# Patient Record
Sex: MALE | Race: WHITE | Employment: UNEMPLOYED | URBAN - METROPOLITAN AREA
[De-identification: names, ages, dates, MRNs, and addresses within clinical notes are randomized per-mention and may not be internally consistent; named-entity substitution may affect disease eponyms.]

---

## 2018-06-05 ENCOUNTER — HOSPITAL ENCOUNTER (OUTPATIENT)
Facility: CLINIC | Age: 58
Discharge: HOME | End: 2018-06-05
Attending: INTERNAL MEDICINE | Admitting: INTERNAL MEDICINE
Payer: MEDICAID

## 2018-06-05 VITALS
BODY MASS INDEX: 42.43 KG/M2 | RESPIRATION RATE: 16 BRPM | OXYGEN SATURATION: 96 % | HEIGHT: 66 IN | HEART RATE: 81 BPM | WEIGHT: 264 LBS | DIASTOLIC BLOOD PRESSURE: 74 MMHG | SYSTOLIC BLOOD PRESSURE: 115 MMHG

## 2018-06-05 LAB
COLONOSCOPY: NORMAL
GLUCOSE BLDC GLUCOMTR-MCNC: 208 MG/DL (ref 70–99)

## 2018-06-05 PROCEDURE — 25000128 H RX IP 250 OP 636: Performed by: INTERNAL MEDICINE

## 2018-06-05 PROCEDURE — G0121 COLON CA SCRN NOT HI RSK IND: HCPCS | Performed by: INTERNAL MEDICINE

## 2018-06-05 PROCEDURE — 82962 GLUCOSE BLOOD TEST: CPT

## 2018-06-05 PROCEDURE — G0500 MOD SEDAT ENDO SERVICE >5YRS: HCPCS | Performed by: INTERNAL MEDICINE

## 2018-06-05 PROCEDURE — 45378 DIAGNOSTIC COLONOSCOPY: CPT | Performed by: INTERNAL MEDICINE

## 2018-06-05 RX ORDER — NALOXONE HYDROCHLORIDE 0.4 MG/ML
.1-.4 INJECTION, SOLUTION INTRAMUSCULAR; INTRAVENOUS; SUBCUTANEOUS
Status: DISCONTINUED | OUTPATIENT
Start: 2018-06-05 | End: 2018-06-05 | Stop reason: HOSPADM

## 2018-06-05 RX ORDER — ONDANSETRON 2 MG/ML
4 INJECTION INTRAMUSCULAR; INTRAVENOUS
Status: DISCONTINUED | OUTPATIENT
Start: 2018-06-05 | End: 2018-06-05 | Stop reason: HOSPADM

## 2018-06-05 RX ORDER — LIDOCAINE 40 MG/G
CREAM TOPICAL
Status: DISCONTINUED | OUTPATIENT
Start: 2018-06-05 | End: 2018-06-05 | Stop reason: HOSPADM

## 2018-06-05 RX ORDER — FLUMAZENIL 0.1 MG/ML
0.2 INJECTION, SOLUTION INTRAVENOUS
Status: DISCONTINUED | OUTPATIENT
Start: 2018-06-05 | End: 2018-06-05 | Stop reason: HOSPADM

## 2018-06-05 RX ORDER — ONDANSETRON 4 MG/1
4 TABLET, ORALLY DISINTEGRATING ORAL EVERY 6 HOURS PRN
Status: DISCONTINUED | OUTPATIENT
Start: 2018-06-05 | End: 2018-06-05 | Stop reason: HOSPADM

## 2018-06-05 RX ORDER — FENTANYL CITRATE 50 UG/ML
INJECTION, SOLUTION INTRAMUSCULAR; INTRAVENOUS PRN
Status: DISCONTINUED | OUTPATIENT
Start: 2018-06-05 | End: 2018-06-05 | Stop reason: HOSPADM

## 2018-06-05 RX ORDER — ONDANSETRON 2 MG/ML
4 INJECTION INTRAMUSCULAR; INTRAVENOUS EVERY 6 HOURS PRN
Status: DISCONTINUED | OUTPATIENT
Start: 2018-06-05 | End: 2018-06-05 | Stop reason: HOSPADM

## 2018-06-05 NOTE — H&P
Pre-Endoscopy History and Physical     Jamel Wu MRN# [id number]   YOB: 1960 Age: 58 year old     Date of Procedure: 6/5/2018  Primary care provider: [facility]  Type of Endoscopy: Colonoscopy with possible biopsy, possible polypectomy  Reason for Procedure: screen  Type of Anesthesia Anticipated: Conscious Sedation    HPI:    Jamel is a 58 year old male who will be undergoing the above procedure.      A history and physical has been performed. The patient's medications and allergies have been reviewed. The risks and benefits of the procedure and the sedation options and risks were discussed with the patient.  All questions were answered and informed consent was obtained.      He denies a personal or family history of anesthesia complications or bleeding disorders.     There is no problem list on file for this patient.       No past medical history on file.     No past surgical history on file.    Social History   Substance Use Topics     Smoking status: Not on file     Smokeless tobacco: Not on file     Alcohol use Not on file       No family history on file.    Prior to Admission medications    Not on File       Allergies not on file     REVIEW OF SYSTEMS:   5 point ROS negative except as noted above in HPI, including Gen., Resp., CV, GI &  system review.    PHYSICAL EXAM:   There were no vitals taken for this visit. There is no height or weight on file to calculate BMI.   GENERAL APPEARANCE: alert, and oriented  MENTAL STATUS: alert  AIRWAY EXAM: Mallampatti Class I (visualization of the soft palate, fauces, uvula, anterior and posterior pillars)  RESP: lungs clear to auscultation - no rales, rhonchi or wheezes  CV: regular rates and rhythm  DIAGNOSTICS:    Not indicated    IMPRESSION   ASA Class 2 - Mild systemic disease    PLAN:   Plan for Colonoscopy with possible biopsy, possible polypectomy. We discussed the risks, benefits and alternatives and the  patient wished to proceed.    The above has been forwarded to the consulting provider.      Signed Electronically by: Ceasar Roberson  June 5, 2018

## 2018-06-05 NOTE — IP AVS SNAPSHOT
[facility]    [address]    [facility] [address]    Phone:  [phone]    Fax:  [phone]                                       After Visit Summary   6/5/2018    Jamel Wu    MRN: [id number]           After Visit Summary Signature Page     I have received my discharge instructions, and my questions have been answered. I have discussed any challenges I see with this plan with the nurse or doctor.    ..........................................................................................................................................  Patient/Patient Representative Signature      ..........................................................................................................................................  Patient Representative Print Name and Relationship to Patient    ..................................................               ................................................  Date                                            Time    ..........................................................................................................................................  Reviewed by Signature/Title    ...................................................              ..............................................  Date                                                            Time

## 2018-06-05 NOTE — IP AVS SNAPSHOT
"                  MRN:[id number]                      After Visit Summary   6/5/2018    Jamel Wu    MRN: [id number]           Thank you!     Thank you for choosing [facility] for your care. Our goal is always to provide you with excellent care. Hearing back from our patients is one way we can continue to improve our services. Please take a few minutes to complete the written survey that you may receive in the mail after you visit. If you would like to speak to someone directly about your visit please contact Patient Relations at [phone]. Thank you!          Patient Information     Date Of Birth          1960        About your hospital stay     You were admitted on:  June 5, 2018 You last received care in the:  [facility]    You were discharged on:  June 5, 2018       Who to Call     For medical emergencies, please call 911.  For non-urgent questions about your medical care, please call your primary care provider or clinic, None  For questions related to your surgery, please call your surgery clinic        Attending Provider     Provider Specialty    Ceasar Roberson MD Gastroenterology       Primary Care Provider Fax #    [facility] [phone]      Further instructions from your care team       The patient has received a copy of the Provation  report the doctor has written and discharge instructions have been discussed with the patient and responsible adult.  All questions were addressed and answered prior to patient discharge.    Pending Results     No orders found from 6/3/2018 to 6/6/2018.            Admission Information     Date & Time Provider Department Dept. Phone    6/5/2018 Ceasar Roberson MD [facility] [phone]      Your Vitals Were     Blood Pressure Pulse Respirations Height Weight Pulse Oximetry    102/70 81 5 1.676 m (5' 6\") 119.7 kg (264 lb) 92%    BMI (Body Mass Index)                   42.61 kg/m2   " "        MyChart Information     [employer] lets you send messages to your doctor, view your test results, renew your prescriptions, schedule appointments and more. To sign up, go to [email or web address] . Click on \"Log in\" on the left side of the screen, which will take you to the Welcome page. Then click on \"Sign up Now\" on the right side of the page.     You will be asked to enter the access code listed below, as well as some personal information. Please follow the directions to create your username and password.     Your access code is: [email or web address]  Expires: 9/3/2018  1:03 PM     Your access code will  in 90 days. If you need help or a new code, please call your [facility] clinic or [phone].        Care EveryWhere ID     This is your Care EveryWhere ID. This could be used by other organizations to access your [facility] medical records  [id number]        Equal Access to Services     CYNDI ARANA : Josias nielseno Somame, waaxda lunikky, qaybta kaalmada adealessia, ale aquino . So [facility] [phone].    ATENCIÓN: Si nanciela espjoey, tiene a joseph disposición servicios gratuitos de asistencia lingüística. Llame al [phone].    We comply with applicable federal civil rights laws and [place] laws. We do not discriminate on the basis of race, color, national origin, age, disability, sex, sexual orientation, or gender identity.               Review of your medicines      CONTINUE these medicines which have NOT CHANGED        Dose / Directions    GLYBURIDE PO        Dose:  10 mg   Take 10 mg by mouth daily (with breakfast)   Refills:  0       LISINOPRIL PO        Dose:  5 mg   Take 5 mg by mouth   Refills:  0       METFORMIN HCL PO        Dose:  1000 mg   Take 1,000 mg by mouth 2 times daily (with meals)   Refills:  0                Protect others around you: Learn how to safely use, store and throw away your medicines at www.disposemymeds.org.             Medication List: " This is a list of all your medications and when to take them. Check marks below indicate your daily home schedule. Keep this list as a reference.      Medications           Morning Afternoon Evening Bedtime As Needed    GLYBURIDE PO   Take 10 mg by mouth daily (with breakfast)                                LISINOPRIL PO   Take 5 mg by mouth                                METFORMIN HCL PO   Take 1,000 mg by mouth 2 times daily (with meals)                                          More Information        Qué es la diverticulosis y la diverticulitis     Las bolsas internas o los divertículos suelen aparecer en la parte inferior del colon, llamado  sigmoide .     El colon (intestino grueso) es la última parte del sistema digestivo. Absorbe el agua de las heces para solidificarlas. En ciertos casos, pueden formarse bolsas (llamadas divertículos) en la pared del colon. Esta afección se llama diverticulosis. Estas bolsas internas pueden infectarse y, si eso sucede, se convierte en un problema más grave llamado diverticulitis. Jackie problema puede ser doloroso, vinicius puede manejarse.  Maneje joseph afección  El primer paso suele ser hacer cambios en la dieta o sandee ciertos medicamentos. Estas medidas pueden ser suficientes para aliviar el problema. Si se trata de un rachele más grave, podría hacerse dung cirugía. Usted y joseph médico podrán decidir juntos cuál es el plan más adecuado para usted.  Si tiene diverticulosis  Estas son algunas recomendaciones:    Los cambios en la dieta suelen ser suficientes para controlar los síntomas. Los cambios principales consisten en consumir más fibra y beber más agua. La fibra absorbe agua a medida que se desplaza por el colon, lo que contribuye a que las heces se mantengan blandas y se muevan más fácilmente. El agua contribuye a jackie proceso.    En rachele necesario, podrían recomendarle que tome ablandadores de heces de venta sin receta.    Para ayudarle a aliviar el dolor, podrían recetarle  medicamentos antiespasmódicos.    Observe si hay cambios en la manera en que evacua los intestinos. Dígale al proveedor de atención médica si nota algún cambio.    Comience un programa de ejercicios. Pida a joseph proveedor de atención médica que le aconseje cómo comenzar.    Descanse y duerma mucho.   Si tiene diverticulitis  El tratamiento dependerá de la gravedad de dinora síntomas.    Para síntomas leves. Es posible que deba seguir dung dieta líquida sapna un breve período. Probablemente, le receten antibióticos. Si estas dos medidas alivian dinora síntomas, podrían recetarle dung dieta jimmy en fibra. Si todavía tiene síntomas, joseph proveedor de atención médica hablará con usted acerca de las opciones de tratamiento adicional disponibles.    Para síntomas graves. Posiblemente, lo admitan en el hospital. Es posible que allí le administren líquidos y antibióticos por vía intravenosa (IV). Dung vez que se hayan controlado los síntomas graves, pueden intentarse los tratamientos mencionados para síntomas leves. Si estos tratamientos no logran aliviar joseph problema, hable con joseph médico sobre la posibilidad de hacerse dung cirugía.  Claves para la nan del colon     La diverticulitis se produce cuando estas bolsas se infectan o se inflaman.   Usted puede mejorar la nan de joseph colon con dung dieta que incluya abundantes alimentos ricos en fibra, artemio frutas, vegetales y granos integrales. Clair abundantes líquidos, artemio agua y jugo de fruta. Mantenga un estilo de alanna saludable que incluya ejercicio, manejo del estrés y descansar y dormir adecuadamente.  Date Last Reviewed: 2/19/2014 2000-2017 [employer]. [address] [place] [address], [place] [address]. Todos los derechos reservados. Esta información no pretende sustituir la atención médica profesional. Sólo joseph médico puede diagnosticar y tratar un problema de nan.                Dieta Hetal En Fibra [High Fiber Diet]  La fibra esta presente en todas las frutas, verduras,  cereales y granos. Es la porción de los alimentos que pasa por el cuerpo sin ser digerido. Allison dieta samanta en fibra ayuda a que los alimentos pasen por las vías intestinales. El volumen adicional le ayudará a evitar estreñimiento y los casos de diverticulosis, limpiando las bolsas en la pared del colon y evitando que otras se michael. Allison dieta samanta en fibra también reduce el riesgo del cáncer del colon, reduce el colesterol en la simone y arcelia el azúcar alto de la simone en diabéticos después de comer.    Los alimentos en la lista que sigue son altos en fibra y deben ser incluidos en joseph dieta. Si usted no esta acostumbrado a los alimentos altos en fibra, comienza con 1 o 2 de los alimentos en esta lista. De cada 3 a 4 días, añade un alimento nuevo a joseph dieta hasta que usted está comiendo 4 alimentos altos en fibra al día. [place] le dará de 20 a 35 gramos de fibra al día. También es importante que, mientras esté usando esta dieta, xiang bastante agua (de 6 a 8 vasos por día). El agua hace que la fibra se hincha y aumenta el beneficio.  Alimentos Altos En Fibra Dietética:  PAN: Hechos con harina 100% de gio entero, galletas de [place], de gio o avalos, tortillas, panes de salvado (afrecho).  CEREALES: Granos enteros con salvado (Chex, Raisin Bran, Corn Bran), leo entera, leo machucada, granola, hojuelas de gio, arroz no procesado.  NUECES: Cualquier clase de nueces.  FRUTAS: Todas las frutas frescas con dinora cáscaras comestibles (que se pueden comer) (bananos, frutas cítricas, mangos, peras, ciruelas, pasas, manzanas, blank, albaricoques, melón, jaleas y mermeladas) jugo de frutas [especialmente jugo de ciruela (Prune)].  VERDURAS: Todas clase de verduras, aún mejor si están crudas o poco cocidas [especialmente apio, berenjena, jamshid, espinacas, bróculi, coles de Bruselas, calabaza del invierno, zanahorias, coliflor, soya, lentejas, fríjoles secos y frescos de toda clase].  OTROS: Palomitas de yovani, cualquier  condimento.  Si Usted Tiene Diverticulosis, Rajwinder Estos Alimentos:  Semillas pequeñas pueden ser atrapadas en las bolsas del colon y causar un ataque de diverticulitis. Entonces debe evitar los condimentos que tienen semillas pequeñas (eneldo, apio), frutas con semillas pequeñas (fresas, arándano, frambuesas, zarzamoras, pasas, uvas con semillas), las palomitas de maíz. Si puede evitar tragar las semillas, puede comer frutas con semillas más grandes (artemio la sandía y frutas cítricas).  Date Last Reviewed: 11/19/2013 2000-2017 [email or web address]. [address] [place] [id number]. Todos los derechos reservados. Esta información no pretende sustituir la atención médica profesional. Sólo joseph médico puede diagnosticar y tratar un problema de nan.                Dung dieta con alto contenido de fibra  La fibra es el tejido que da consistencia y estructura a las plantas. La mayoría de los cereales, frijoles, verduras y frutas contienen fibra. Las comidas ricas en fibra llenan más y tienen menos calorías y grasa. También pueden reducir el riesgo de ciertos trastornos de nan. Para saber la cantidad de fibra de los alimentos en marshal, empacados o congelados, erin la etiqueta de información nutricional (Nutrition Facts) donde se indicará la cantidad de dung porción.    Beneficios de los distintos tipos de fibra  Existen dos tipos de fibra: las insolubles y las solubles. Ambas facilitan la digestión y ayudan a controlar el peso.  Fibra insoluble. Jackie tipo de fibra se encuentra en cereales integrales y ciertas frutas y verduras (artemio la piel de la manzana, el maíz y las zanahorias). La fibra insoluble puede prevenir el estreñimiento y reducir ciertos tipos de cáncer. Se llama insoluble porque no se disuelve en agua.  Fibra soluble. Jackie tipo de fibra se encuentra en la leo, en los frijoles y en ciertas frutas y verduras (artemio fresas y arvejas). La fibra soluble puede reducir el nivel de colesterol (lo cual  puede ayudar a prevenir el riesgo de trastornos cardíacos) y ayuda a controlar el nivel de azúcar en la simone.  Escoja alimentos ricos en fibra  Pruebe estos alimentos para sumar fibra a joseph dieta:  Cereales y panes de grano integral. Trate de comer 6-8 onzas al día. Incluya gio y cereales con salvado de leo, bollos o tostadas de pan integral y tortillas de maíz en dinora comidas.  Frutas. Trate de comer dos tazas al día. Las manzanas, naranjas, fresas, peras y bananas son buenas gramajo de fibra. (Nota: Los jugos generalmente tienen poca fibra.)  Vegetales. Trate de comer por lo menos dos tazas y media al día. Añada espárragos, zanahorias, brócoli, arvejas y maíz a dinora comidas.  Legumbres (frijoles, lentejas y garbanzos). Dung taza de lentejas cocidas proporcionan más de 15 gramos de fibra. También pruebe las habas.  Semillas. Un puñado de semillas (de girasol, por ejemplo) le da aproximadamente 3 g de fibra. Pruebe con semillas de girasol o chía.  Lleve cuenta de la fibra que consume  Virgilio un seguimiento de cuánta fibra come. Comience por leer las etiquetas de nutrición de los alimentos y compre dung variedad de ellos con alto contenido de fibra. A medida que comience a comer más fibra, pregunte a joseph proveedor de atención médica cuánta agua debería estar bebiendo para que joseph sistema digestivo continúe funcionando lottie.   Fije dung cantidad meta de fibra diaria para joseph dieta. Si es eduin, shona cantidad será entre 25 y 28 gramos por día. Es recomendable que los hombres apunten a consumir de 30 a 33 gramos diarios. Después de los 50 años de edad, el consumo diario debe bajar a 22 gramos para las mujeres y a 28 gramos para los hombres.  Antes de echar mano de los suplementos de fibra, piense lo siguiente. La fibra está presente naturalmente en los alimentos integrales saludables. Le da shona sensación de saciedad después de comer. Emma suplementos de fibra o comer alimentos enriquecidos con fibra no le dará esta sensación  de satisfacción.  Joseph ingesta de fibra es dung buena medida de la calidad de joseph dieta general. Si le está faltando joseph cuota diaria de fibra, es posible que también le estén faltando otros nutrientes importantes.  Date Last Reviewed: 11/7/2017 2000-2017 [employer]. [address] [place] [address]. Todos los derechos reservados. Esta información no pretende sustituir la atención médica profesional. Sólo joseph médico puede diagnosticar y tratar un problema de nan.

## 2018-10-01 ENCOUNTER — HOSPITAL ENCOUNTER (EMERGENCY)
Dept: HOSPITAL 90 - EDH | Age: 58
LOS: 1 days | Discharge: HOME | End: 2018-10-02
Payer: COMMERCIAL

## 2018-10-01 DIAGNOSIS — Z91.19: ICD-10-CM

## 2018-10-01 DIAGNOSIS — E11.65: ICD-10-CM

## 2018-10-01 DIAGNOSIS — Z90.49: ICD-10-CM

## 2018-10-01 DIAGNOSIS — I10: ICD-10-CM

## 2018-10-01 DIAGNOSIS — R07.89: Primary | ICD-10-CM

## 2018-10-01 DIAGNOSIS — Z72.0: ICD-10-CM

## 2018-10-01 DIAGNOSIS — Z91.11: ICD-10-CM

## 2018-10-01 LAB
ACETONE,BLOOD: NEGATIVE
ALBUMIN SERPL-MCNC: 3.2 G/DL (ref 3.5–5)
ALT SERPL-CCNC: 26 U/L (ref 12–78)
APTT PPP: 28.1 SEC (ref 26.3–35.5)
AST SERPL-CCNC: 15 U/L (ref 10–37)
BASOPHILS NFR BLD AUTO: 1.1 % (ref 0–5)
BILIRUB SERPL-MCNC: 0.2 MG/DL (ref 0.2–1)
BUN SERPL-MCNC: 13 MG/DL (ref 7–18)
CHLORIDE SERPL-SCNC: 98 MMOL/L (ref 101–111)
CK SERPL-CCNC: 46 U/L (ref 21–232)
CO2 SERPL-SCNC: 25 MMOL/L (ref 21–32)
CREAT SERPL-MCNC: 1 MG/DL (ref 0.5–1.5)
EOSINOPHIL NFR BLD AUTO: 1.6 % (ref 0–8)
ERYTHROCYTE [DISTWIDTH] IN BLOOD BY AUTOMATED COUNT: 13.2 % (ref 11–15.5)
GFR SERPL CREATININE-BSD FRML MDRD: 81 ML/MIN (ref 60–?)
GLUCOSE SERPL-MCNC: 488 MG/DL (ref 70–105)
GLUCOSE UR STRIP-MCNC: >=1000 MG/DL
HCT VFR BLD AUTO: 46.5 % (ref 42–54)
INR PPP: 0.92 (ref 0.85–1.15)
LIPASE SERPL-CCNC: 71 U/L (ref 114–286)
LYMPHOCYTES NFR SPEC AUTO: 31.4 % (ref 21–51)
MCH RBC QN AUTO: 29.8 PG (ref 27–33)
MCHC RBC AUTO-ENTMCNC: 34.4 G/DL (ref 32–36)
MCV RBC AUTO: 86.6 FL (ref 79–99)
MONOCYTES NFR BLD AUTO: 4.2 % (ref 3–13)
MYOGLOBIN SERPL-MCNC: 22 NG/ML (ref 10–92)
NEUTROPHILS NFR BLD AUTO: 61.7 % (ref 40–77)
NRBC BLD MANUAL-RTO: 0 % (ref 0–0.19)
PH UR STRIP: 6 [PH] (ref 5–8)
PLATELET # BLD AUTO: 196 K/UL (ref 130–400)
POTASSIUM SERPL-SCNC: 4.3 MMOL/L (ref 3.5–5.1)
PROT SERPL-MCNC: 7.5 G/DL (ref 6–8.3)
PROTHROMBIN TIME: 9.7 SEC (ref 9.6–11.6)
RBC # BLD AUTO: 5.36 MIL/UL (ref 4.5–6.2)
SODIUM SERPL-SCNC: 134 MMOL/L (ref 136–145)
SP GR UR STRIP: 1.03 (ref 1–1.03)
UROBILINOGEN UR STRIP-MCNC: 0.2 MG/DL (ref 0.2–1)
WBC # BLD AUTO: 10.5 K/UL (ref 4.8–10.8)

## 2018-10-01 PROCEDURE — 96361 HYDRATE IV INFUSION ADD-ON: CPT

## 2018-10-01 PROCEDURE — 94761 N-INVAS EAR/PLS OXIMETRY MLT: CPT

## 2018-10-01 PROCEDURE — 82009 KETONE BODYS QUAL: CPT

## 2018-10-01 PROCEDURE — 83605 ASSAY OF LACTIC ACID: CPT

## 2018-10-01 PROCEDURE — 80053 COMPREHEN METABOLIC PANEL: CPT

## 2018-10-01 PROCEDURE — 93005 ELECTROCARDIOGRAM TRACING: CPT

## 2018-10-01 PROCEDURE — 83874 ASSAY OF MYOGLOBIN: CPT

## 2018-10-01 PROCEDURE — 71045 X-RAY EXAM CHEST 1 VIEW: CPT

## 2018-10-01 PROCEDURE — 85025 COMPLETE CBC W/AUTO DIFF WBC: CPT

## 2018-10-01 PROCEDURE — 82550 ASSAY OF CK (CPK): CPT

## 2018-10-01 PROCEDURE — 81001 URINALYSIS AUTO W/SCOPE: CPT

## 2018-10-01 PROCEDURE — 96374 THER/PROPH/DIAG INJ IV PUSH: CPT

## 2018-10-01 PROCEDURE — 36415 COLL VENOUS BLD VENIPUNCTURE: CPT

## 2018-10-01 PROCEDURE — 84484 ASSAY OF TROPONIN QUANT: CPT

## 2018-10-01 PROCEDURE — 82948 REAGENT STRIP/BLOOD GLUCOSE: CPT

## 2018-10-01 PROCEDURE — 99285 EMERGENCY DEPT VISIT HI MDM: CPT

## 2018-10-01 PROCEDURE — 83690 ASSAY OF LIPASE: CPT

## 2018-10-01 PROCEDURE — 85610 PROTHROMBIN TIME: CPT

## 2018-10-01 PROCEDURE — 85730 THROMBOPLASTIN TIME PARTIAL: CPT

## (undated) DEVICE — KIT ENDO TURNOVER/PROCEDURE W/CLEAN A SCOPE LINERS 103888

## (undated) RX ORDER — FENTANYL CITRATE 50 UG/ML
INJECTION, SOLUTION INTRAMUSCULAR; INTRAVENOUS
Status: DISPENSED
Start: 2018-06-05